# Patient Record
Sex: MALE | Race: WHITE | Employment: FULL TIME | URBAN - METROPOLITAN AREA
[De-identification: names, ages, dates, MRNs, and addresses within clinical notes are randomized per-mention and may not be internally consistent; named-entity substitution may affect disease eponyms.]

---

## 2017-01-30 ENCOUNTER — TELEPHONE (OUTPATIENT)
Dept: CARDIOLOGY CLINIC | Age: 52
End: 2017-01-30

## 2017-04-10 RX ORDER — ATORVASTATIN CALCIUM 40 MG/1
TABLET, FILM COATED ORAL
Qty: 30 TABLET | Refills: 6 | Status: SHIPPED | OUTPATIENT
Start: 2017-04-10 | End: 2019-03-13

## 2019-03-05 ENCOUNTER — TELEPHONE (OUTPATIENT)
Dept: CARDIOLOGY CLINIC | Age: 54
End: 2019-03-05

## 2019-03-13 ENCOUNTER — OFFICE VISIT (OUTPATIENT)
Dept: CARDIOLOGY CLINIC | Age: 54
End: 2019-03-13
Payer: COMMERCIAL

## 2019-03-13 VITALS
HEART RATE: 87 BPM | BODY MASS INDEX: 35.78 KG/M2 | OXYGEN SATURATION: 93 % | WEIGHT: 270 LBS | HEIGHT: 73 IN | DIASTOLIC BLOOD PRESSURE: 72 MMHG | SYSTOLIC BLOOD PRESSURE: 138 MMHG

## 2019-03-13 DIAGNOSIS — I25.10 ATHEROSCLEROSIS OF NATIVE CORONARY ARTERY OF NATIVE HEART WITHOUT ANGINA PECTORIS: Primary | ICD-10-CM

## 2019-03-13 DIAGNOSIS — E78.2 MIXED HYPERLIPIDEMIA: ICD-10-CM

## 2019-03-13 DIAGNOSIS — I10 ESSENTIAL HYPERTENSION: ICD-10-CM

## 2019-03-13 PROCEDURE — 99214 OFFICE O/P EST MOD 30 MIN: CPT | Performed by: NURSE PRACTITIONER

## 2019-03-13 PROCEDURE — 93000 ELECTROCARDIOGRAM COMPLETE: CPT | Performed by: NURSE PRACTITIONER

## 2019-03-13 RX ORDER — ATORVASTATIN CALCIUM 40 MG/1
40 TABLET, FILM COATED ORAL DAILY
COMMUNITY

## 2019-07-10 ENCOUNTER — HOSPITAL ENCOUNTER (OUTPATIENT)
Age: 54
Setting detail: OBSERVATION
Discharge: HOME | End: 2019-07-11
Attending: EMERGENCY MEDICINE | Admitting: INTERNAL MEDICINE
Payer: COMMERCIAL

## 2019-07-10 ENCOUNTER — APPOINTMENT (OUTPATIENT)
Dept: GENERAL RADIOLOGY | Age: 54
End: 2019-07-10
Payer: COMMERCIAL

## 2019-07-10 DIAGNOSIS — R11.0 NAUSEA: ICD-10-CM

## 2019-07-10 DIAGNOSIS — R42 LIGHTHEADEDNESS: ICD-10-CM

## 2019-07-10 DIAGNOSIS — M25.512 ACUTE PAIN OF BOTH SHOULDERS: Primary | ICD-10-CM

## 2019-07-10 DIAGNOSIS — R61 DIAPHORESIS: ICD-10-CM

## 2019-07-10 DIAGNOSIS — M25.511 ACUTE PAIN OF BOTH SHOULDERS: Primary | ICD-10-CM

## 2019-07-10 PROBLEM — R07.9 CHEST PAIN: Status: ACTIVE | Noted: 2019-07-10

## 2019-07-10 PROBLEM — I25.2 HISTORY OF HEART ATTACK: Status: ACTIVE | Noted: 2019-07-10

## 2019-07-10 LAB
A/G RATIO: 1.5 (ref 1.1–2.2)
ALBUMIN SERPL-MCNC: 4.3 G/DL (ref 3.4–5)
ALP BLD-CCNC: 68 U/L (ref 40–129)
ALT SERPL-CCNC: 41 U/L (ref 10–40)
ANION GAP SERPL CALCULATED.3IONS-SCNC: 14 MMOL/L (ref 3–16)
APTT: 30.2 SEC (ref 26–36)
AST SERPL-CCNC: 27 U/L (ref 15–37)
BASOPHILS ABSOLUTE: 0 K/UL (ref 0–0.2)
BASOPHILS RELATIVE PERCENT: 0.2 %
BILIRUB SERPL-MCNC: 0.4 MG/DL (ref 0–1)
BUN BLDV-MCNC: 14 MG/DL (ref 7–20)
CALCIUM SERPL-MCNC: 9 MG/DL (ref 8.3–10.6)
CHLORIDE BLD-SCNC: 102 MMOL/L (ref 99–110)
CO2: 21 MMOL/L (ref 21–32)
CREAT SERPL-MCNC: 0.7 MG/DL (ref 0.9–1.3)
EOSINOPHILS ABSOLUTE: 0.4 K/UL (ref 0–0.6)
EOSINOPHILS RELATIVE PERCENT: 3.4 %
GFR AFRICAN AMERICAN: >60
GFR NON-AFRICAN AMERICAN: >60
GLOBULIN: 2.8 G/DL
GLUCOSE BLD-MCNC: 223 MG/DL (ref 70–99)
HCT VFR BLD CALC: 45.7 % (ref 40.5–52.5)
HEMOGLOBIN: 15.4 G/DL (ref 13.5–17.5)
INR BLD: 0.99 (ref 0.86–1.14)
LYMPHOCYTES ABSOLUTE: 3.7 K/UL (ref 1–5.1)
LYMPHOCYTES RELATIVE PERCENT: 33.2 %
MCH RBC QN AUTO: 29.5 PG (ref 26–34)
MCHC RBC AUTO-ENTMCNC: 33.8 G/DL (ref 31–36)
MCV RBC AUTO: 87.3 FL (ref 80–100)
MONOCYTES ABSOLUTE: 0.8 K/UL (ref 0–1.3)
MONOCYTES RELATIVE PERCENT: 7.5 %
NEUTROPHILS ABSOLUTE: 6.2 K/UL (ref 1.7–7.7)
NEUTROPHILS RELATIVE PERCENT: 55.7 %
PDW BLD-RTO: 13.2 % (ref 12.4–15.4)
PLATELET # BLD: 201 K/UL (ref 135–450)
PMV BLD AUTO: 8.8 FL (ref 5–10.5)
POTASSIUM SERPL-SCNC: 3.8 MMOL/L (ref 3.5–5.1)
PRO-BNP: 17 PG/ML (ref 0–124)
PROTHROMBIN TIME: 11.3 SEC (ref 9.8–13)
RBC # BLD: 5.23 M/UL (ref 4.2–5.9)
SODIUM BLD-SCNC: 137 MMOL/L (ref 136–145)
TOTAL PROTEIN: 7.1 G/DL (ref 6.4–8.2)
TROPONIN: <0.01 NG/ML
WBC # BLD: 11.1 K/UL (ref 4–11)

## 2019-07-10 PROCEDURE — 83880 ASSAY OF NATRIURETIC PEPTIDE: CPT

## 2019-07-10 PROCEDURE — 85730 THROMBOPLASTIN TIME PARTIAL: CPT

## 2019-07-10 PROCEDURE — 6360000002 HC RX W HCPCS: Performed by: PHYSICIAN ASSISTANT

## 2019-07-10 PROCEDURE — 99285 EMERGENCY DEPT VISIT HI MDM: CPT

## 2019-07-10 PROCEDURE — 96374 THER/PROPH/DIAG INJ IV PUSH: CPT

## 2019-07-10 PROCEDURE — G0378 HOSPITAL OBSERVATION PER HR: HCPCS

## 2019-07-10 PROCEDURE — 93005 ELECTROCARDIOGRAM TRACING: CPT | Performed by: PHYSICIAN ASSISTANT

## 2019-07-10 PROCEDURE — 6370000000 HC RX 637 (ALT 250 FOR IP): Performed by: PHYSICIAN ASSISTANT

## 2019-07-10 PROCEDURE — 36415 COLL VENOUS BLD VENIPUNCTURE: CPT

## 2019-07-10 PROCEDURE — 85025 COMPLETE CBC W/AUTO DIFF WBC: CPT

## 2019-07-10 PROCEDURE — 84484 ASSAY OF TROPONIN QUANT: CPT

## 2019-07-10 PROCEDURE — 85610 PROTHROMBIN TIME: CPT

## 2019-07-10 PROCEDURE — 80053 COMPREHEN METABOLIC PANEL: CPT

## 2019-07-10 PROCEDURE — 71045 X-RAY EXAM CHEST 1 VIEW: CPT

## 2019-07-10 RX ORDER — ALPRAZOLAM 0.5 MG/1
0.25 TABLET ORAL 2 TIMES DAILY
Status: DISCONTINUED | OUTPATIENT
Start: 2019-07-11 | End: 2019-07-11 | Stop reason: HOSPADM

## 2019-07-10 RX ORDER — NICOTINE 21 MG/24HR
1 PATCH, TRANSDERMAL 24 HOURS TRANSDERMAL EVERY 24 HOURS
Status: DISCONTINUED | OUTPATIENT
Start: 2019-07-10 | End: 2019-07-11 | Stop reason: HOSPADM

## 2019-07-10 RX ORDER — ASPIRIN 81 MG/1
81 TABLET ORAL DAILY
Status: DISCONTINUED | OUTPATIENT
Start: 2019-07-11 | End: 2019-07-11 | Stop reason: HOSPADM

## 2019-07-10 RX ORDER — ONDANSETRON 2 MG/ML
4 INJECTION INTRAMUSCULAR; INTRAVENOUS EVERY 6 HOURS PRN
Status: DISCONTINUED | OUTPATIENT
Start: 2019-07-10 | End: 2019-07-11 | Stop reason: HOSPADM

## 2019-07-10 RX ORDER — ACETAMINOPHEN 325 MG/1
650 TABLET ORAL EVERY 4 HOURS PRN
Status: DISCONTINUED | OUTPATIENT
Start: 2019-07-10 | End: 2019-07-11 | Stop reason: HOSPADM

## 2019-07-10 RX ORDER — OMEPRAZOLE 20 MG/1
20 TABLET, DELAYED RELEASE ORAL DAILY
Status: DISCONTINUED | OUTPATIENT
Start: 2019-07-11 | End: 2019-07-10 | Stop reason: CLARIF

## 2019-07-10 RX ORDER — PANTOPRAZOLE SODIUM 40 MG/1
40 TABLET, DELAYED RELEASE ORAL
Status: DISCONTINUED | OUTPATIENT
Start: 2019-07-11 | End: 2019-07-11 | Stop reason: HOSPADM

## 2019-07-10 RX ORDER — POTASSIUM CHLORIDE 7.45 MG/ML
10 INJECTION INTRAVENOUS PRN
Status: DISCONTINUED | OUTPATIENT
Start: 2019-07-10 | End: 2019-07-11 | Stop reason: HOSPADM

## 2019-07-10 RX ORDER — ASPIRIN 81 MG/1
81 TABLET, CHEWABLE ORAL DAILY
Status: DISCONTINUED | OUTPATIENT
Start: 2019-07-11 | End: 2019-07-10

## 2019-07-10 RX ORDER — ACETAMINOPHEN 500 MG
500 TABLET ORAL ONCE
Status: COMPLETED | OUTPATIENT
Start: 2019-07-10 | End: 2019-07-10

## 2019-07-10 RX ORDER — SODIUM CHLORIDE 0.9 % (FLUSH) 0.9 %
10 SYRINGE (ML) INJECTION PRN
Status: DISCONTINUED | OUTPATIENT
Start: 2019-07-10 | End: 2019-07-11 | Stop reason: HOSPADM

## 2019-07-10 RX ORDER — ONDANSETRON 2 MG/ML
4 INJECTION INTRAMUSCULAR; INTRAVENOUS ONCE
Status: COMPLETED | OUTPATIENT
Start: 2019-07-10 | End: 2019-07-10

## 2019-07-10 RX ORDER — LISINOPRIL 10 MG/1
10 TABLET ORAL DAILY
Status: DISCONTINUED | OUTPATIENT
Start: 2019-07-11 | End: 2019-07-11 | Stop reason: HOSPADM

## 2019-07-10 RX ORDER — POTASSIUM CHLORIDE 20 MEQ/1
40 TABLET, EXTENDED RELEASE ORAL PRN
Status: DISCONTINUED | OUTPATIENT
Start: 2019-07-10 | End: 2019-07-11 | Stop reason: HOSPADM

## 2019-07-10 RX ORDER — SODIUM CHLORIDE 0.9 % (FLUSH) 0.9 %
10 SYRINGE (ML) INJECTION EVERY 12 HOURS SCHEDULED
Status: DISCONTINUED | OUTPATIENT
Start: 2019-07-10 | End: 2019-07-11 | Stop reason: HOSPADM

## 2019-07-10 RX ORDER — METHOCARBAMOL 500 MG/1
750 TABLET, FILM COATED ORAL EVERY 6 HOURS PRN
Status: DISCONTINUED | OUTPATIENT
Start: 2019-07-10 | End: 2019-07-11 | Stop reason: HOSPADM

## 2019-07-10 RX ORDER — ATORVASTATIN CALCIUM 80 MG/1
80 TABLET, FILM COATED ORAL NIGHTLY
Status: DISCONTINUED | OUTPATIENT
Start: 2019-07-11 | End: 2019-07-11 | Stop reason: HOSPADM

## 2019-07-10 RX ADMIN — ACETAMINOPHEN 500 MG: 500 TABLET, FILM COATED ORAL at 20:26

## 2019-07-10 RX ADMIN — ONDANSETRON 4 MG: 2 INJECTION INTRAMUSCULAR; INTRAVENOUS at 20:26

## 2019-07-10 RX ADMIN — NITROGLYCERIN 1 INCH: 20 OINTMENT TOPICAL at 20:26

## 2019-07-10 ASSESSMENT — ENCOUNTER SYMPTOMS
ABDOMINAL DISTENTION: 0
ABDOMINAL PAIN: 0
STRIDOR: 0
COLOR CHANGE: 0
SHORTNESS OF BREATH: 0
NAUSEA: 1
CONSTIPATION: 0
VOMITING: 0
BACK PAIN: 0
COUGH: 0
DIARRHEA: 0
WHEEZING: 0

## 2019-07-10 ASSESSMENT — PAIN DESCRIPTION - ORIENTATION: ORIENTATION: RIGHT;UPPER

## 2019-07-10 ASSESSMENT — PAIN SCALES - GENERAL
PAINLEVEL_OUTOF10: 0
PAINLEVEL_OUTOF10: 1
PAINLEVEL_OUTOF10: 1

## 2019-07-10 ASSESSMENT — PAIN DESCRIPTION - PAIN TYPE: TYPE: ACUTE PAIN

## 2019-07-10 ASSESSMENT — PAIN DESCRIPTION - LOCATION: LOCATION: CHEST

## 2019-07-10 ASSESSMENT — HEART SCORE
ECG: 1
ECG: 1

## 2019-07-10 ASSESSMENT — PAIN DESCRIPTION - PROGRESSION: CLINICAL_PROGRESSION: NOT CHANGED

## 2019-07-11 VITALS
HEART RATE: 82 BPM | BODY MASS INDEX: 34.19 KG/M2 | TEMPERATURE: 97 F | OXYGEN SATURATION: 94 % | WEIGHT: 258 LBS | DIASTOLIC BLOOD PRESSURE: 74 MMHG | HEIGHT: 73 IN | SYSTOLIC BLOOD PRESSURE: 119 MMHG | RESPIRATION RATE: 16 BRPM

## 2019-07-11 LAB
CHOLESTEROL, TOTAL: 119 MG/DL (ref 0–199)
EKG ATRIAL RATE: 93 BPM
EKG DIAGNOSIS: NORMAL
EKG P AXIS: 47 DEGREES
EKG P-R INTERVAL: 190 MS
EKG Q-T INTERVAL: 370 MS
EKG QRS DURATION: 94 MS
EKG QTC CALCULATION (BAZETT): 460 MS
EKG R AXIS: 26 DEGREES
EKG T AXIS: 37 DEGREES
EKG VENTRICULAR RATE: 93 BPM
HDLC SERPL-MCNC: 22 MG/DL (ref 40–60)
LDL CHOLESTEROL CALCULATED: 57 MG/DL
LV EF: 50 %
LVEF MODALITY: NORMAL
TRIGL SERPL-MCNC: 199 MG/DL (ref 0–150)
TROPONIN: <0.01 NG/ML
TROPONIN: <0.01 NG/ML
VLDLC SERPL CALC-MCNC: 40 MG/DL

## 2019-07-11 PROCEDURE — 94760 N-INVAS EAR/PLS OXIMETRY 1: CPT

## 2019-07-11 PROCEDURE — 80061 LIPID PANEL: CPT

## 2019-07-11 PROCEDURE — 6370000000 HC RX 637 (ALT 250 FOR IP): Performed by: NURSE PRACTITIONER

## 2019-07-11 PROCEDURE — 6370000000 HC RX 637 (ALT 250 FOR IP): Performed by: INTERNAL MEDICINE

## 2019-07-11 PROCEDURE — 84484 ASSAY OF TROPONIN QUANT: CPT

## 2019-07-11 PROCEDURE — 93010 ELECTROCARDIOGRAM REPORT: CPT | Performed by: INTERNAL MEDICINE

## 2019-07-11 PROCEDURE — 99244 OFF/OP CNSLTJ NEW/EST MOD 40: CPT | Performed by: INTERNAL MEDICINE

## 2019-07-11 PROCEDURE — 36415 COLL VENOUS BLD VENIPUNCTURE: CPT

## 2019-07-11 PROCEDURE — 93320 DOPPLER ECHO COMPLETE: CPT

## 2019-07-11 PROCEDURE — G0378 HOSPITAL OBSERVATION PER HR: HCPCS

## 2019-07-11 PROCEDURE — 93351 STRESS TTE COMPLETE: CPT

## 2019-07-11 RX ORDER — NITROGLYCERIN 0.4 MG/1
0.4 TABLET SUBLINGUAL EVERY 5 MIN PRN
Qty: 25 TABLET | Refills: 3 | Status: SHIPPED | OUTPATIENT
Start: 2019-07-11 | End: 2020-07-07

## 2019-07-11 RX ADMIN — ATORVASTATIN CALCIUM 80 MG: 80 TABLET, FILM COATED ORAL at 00:06

## 2019-07-11 RX ADMIN — METOPROLOL TARTRATE 25 MG: 25 TABLET, FILM COATED ORAL at 00:06

## 2019-07-11 RX ADMIN — ALPRAZOLAM 0.25 MG: 0.5 TABLET ORAL at 00:06

## 2019-07-11 RX ADMIN — PANTOPRAZOLE SODIUM 40 MG: 40 TABLET, DELAYED RELEASE ORAL at 06:08

## 2019-07-11 ASSESSMENT — PAIN SCALES - GENERAL
PAINLEVEL_OUTOF10: 0
PAINLEVEL_OUTOF10: 0

## 2019-07-11 NOTE — CONSULTS
file   Other Topics Concern    Not on file   Social History Narrative    Not on file        Family History:  No evidence for sudden cardiac death or premature CAD. Problem Relation Age of Onset    High Blood Pressure Mother     Arthritis Mother     Heart Disease Brother     Cancer Maternal Grandmother     Cancer Maternal Grandfather        Medications:    sodium chloride flush 0.9 % injection 10 mL 2 times per day   sodium chloride flush 0.9 % injection 10 mL PRN   magnesium hydroxide (MILK OF MAGNESIA) 400 MG/5ML suspension 30 mL Daily PRN   ondansetron (ZOFRAN) injection 4 mg Q6H PRN   enoxaparin (LOVENOX) injection 40 mg Daily   potassium chloride (KLOR-CON M) extended release tablet 40 mEq PRN   Or    potassium bicarb-citric acid (EFFER-K) effervescent tablet 40 mEq PRN   Or    potassium chloride 10 mEq/100 mL IVPB (Peripheral Line) PRN   acetaminophen (TYLENOL) tablet 650 mg Q4H PRN   methocarbamol (ROBAXIN) tablet 750 mg Q6H PRN   perflutren lipid microspheres (DEFINITY) injection 1.65 mg ONCE PRN   nicotine (NICODERM CQ) 14 MG/24HR 1 patch Q24H   metoprolol tartrate (LOPRESSOR) tablet 25 mg BID   ALPRAZolam (XANAX) tablet 0.25 mg BID   atorvastatin (LIPITOR) tablet 80 mg Nightly   aspirin EC tablet 81 mg Daily   lisinopril (PRINIVIL;ZESTRIL) tablet 10 mg Daily   pantoprazole (PROTONIX) tablet 40 mg QAM AC       Allergies:  Morphine;  Chantix [varenicline tartrate]; and Pcn [penicillins]     Review of Systems:   [x]Full ROS obtained and negative except as mentioned in HPI    Physical Examination:    BP (!) 95/56   Pulse 73   Temp 97.2 °F (36.2 °C) (Temporal)   Resp 16   Ht 6' 1\" (1.854 m)   Wt 258 lb (117 kg)   SpO2 91%   BMI 34.04 kg/m²   Wt Readings from Last 3 Encounters:   07/11/19 258 lb (117 kg)   03/13/19 270 lb (122.5 kg)   08/30/17 271 lb 2.7 oz (123 kg)       GENERAL: Well developed, well nourished, no acute distress  NEUROLOGICAL: Alert and oriented x3  PSYCH: Normal mood and

## 2019-07-11 NOTE — PLAN OF CARE
Problem: Cardiovascular  Goal: Hemodynamic stability  Outcome: Ongoing  Note:   Continue to monitor labs vitals heart rate and rhythm  testing and cardiology consult scheduled for 7/11/19     Problem: Pain Control  Goal: Maintain pain level at or below patient's acceptable level (or 5 if patient is unable to determine acceptable level)  Outcome: Ongoing  Note:   Pt is capable of communicating the presence of pain.  Medication as directed and per pt request. Pt advised to call for any changes in pain level and verbalizes understanding

## 2019-07-11 NOTE — ED PROVIDER NOTES
Negative for visual disturbance. Respiratory: Negative for cough, shortness of breath, wheezing and stridor. Cardiovascular: Negative for chest pain, palpitations and leg swelling. Gastrointestinal: Positive for nausea. Negative for abdominal distention, abdominal pain, constipation, diarrhea and vomiting. Genitourinary: Negative. Musculoskeletal: Positive for myalgias. Negative for back pain, neck pain and neck stiffness. Skin: Negative for color change, pallor, rash and wound. Neurological: Positive for light-headedness. Negative for dizziness, tremors, seizures, syncope, facial asymmetry, speech difficulty, weakness, numbness and headaches. Psychiatric/Behavioral: Negative for confusion. All other systems reviewed and are negative. Positives and Pertinent negatives as per HPI. Except as noted abovein the ROS, all other systems were reviewed and negative. PAST MEDICAL HISTORY     Past Medical History:   Diagnosis Date    CAD (coronary artery disease)     MI 7 years ago   Niya Gunning Heart attack [facility]) May 2011    Hyperlipidemia     Hypertension          SURGICAL HISTORY     Past Surgical History:   Procedure Laterality Date    ANGIOPLASTY      CHOLECYSTECTOMY, LAPAROSCOPIC      2005    CYST REMOVAL      on neck    SHOULDER SURGERY  2014         CURRENTMEDICATIONS       Previous Medications    ALPRAZOLAM (XANAX) 0.25 MG TABLET    Take 0.25 mg by mouth 2 times daily. ASPIRIN EC 81 MG EC TABLET    Take 1 tablet by mouth daily. ATORVASTATIN (LIPITOR) 80 MG TABLET    Take 80 mg by mouth daily    LISINOPRIL (PRINIVIL;ZESTRIL) 10 MG TABLET    Take 10 mg by mouth daily. METOPROLOL (LOPRESSOR) 25 MG TABLET    Take 1 tablet by mouth 2 times daily. METOPROLOL TARTRATE (LOPRESSOR) 25 MG TABLET    Take 25 mg by mouth 2 times daily    NITROGLYCERIN (NITROSTAT) 0.4 MG SL TABLET    Place 1 tablet under the tongue every 5 minutes as needed for Chest pain.     OMEPRAZOLE (PRILOSEC OTC) 20 MG disturbance/LBTB/PM  Patient Age: > 39 and < 72 years  *Risk factors for Atherosclerotic disease: Hypertension, Hypercholesterolemia, Coronary Artery Disease  Risk Factors: > 3 Risk factors or history of atherosclerotic disease*  Troponin: < 1X normal limit  Heart Score Total: 5      PHYSICAL EXAM    (up to 7 for level 4, 8 or more for level 5)     ED Triage Vitals [07/10/19 1954]   BP Temp Temp Source Pulse Resp SpO2 Height Weight   131/73 98.8 °F (37.1 °C) Oral 94 16 95 % 6' 1\" (1.854 m) 265 lb (120.2 kg)       Physical Exam   Constitutional: He is oriented to person, place, and time. He appears well-developed and well-nourished. No distress. HENT:   Head: Normocephalic and atraumatic. Right Ear: External ear normal.   Left Ear: External ear normal.   Nose: Nose normal.   Mouth/Throat: Oropharynx is clear and moist.   Eyes: Pupils are equal, round, and reactive to light. Conjunctivae and EOM are normal. Right eye exhibits no discharge. Left eye exhibits no discharge. No scleral icterus. Neck: Normal range of motion. No JVD present. Cardiovascular: Normal rate. Pulmonary/Chest: Effort normal and breath sounds normal.   Abdominal: Soft. Bowel sounds are normal. He exhibits no distension. There is no tenderness. Musculoskeletal: Normal range of motion. No extremity edema, posterior calf or thigh tenderness, palpable cord, discoloration. Negative homans. Lymphadenopathy:     He has no cervical adenopathy. Neurological: He is alert and oriented to person, place, and time. No cranial nerve deficit (II-XII Intact). Skin: Skin is warm. Capillary refill takes less than 2 seconds. No rash noted. He is diaphoretic (clammy). No erythema. No pallor. Psychiatric: He has a normal mood and affect. His behavior is normal.   Nursing note and vitals reviewed.       DIAGNOSTIC RESULTS   LABS:    Labs Reviewed   CBC WITH AUTO DIFFERENTIAL - Abnormal; Notable for the following components:       Result Value made to edit the dictations but occasionally words are mis-transcribed.)    Dimitri So PA-C (electronically signed)           Dimitri So PA-C  07/10/19 2044

## 2019-07-11 NOTE — DISCHARGE SUMMARY
0.25 MG tablet Take 0.25 mg by mouth 2 times daily. omeprazole (PRILOSEC OTC) 20 MG tablet Take 20 mg by mouth daily. aspirin EC 81 MG EC tablet Take 1 tablet by mouth daily. , Disp-30 tablet, R-3      !! nitroGLYCERIN (NITROSTAT) 0.4 MG SL tablet Place 1 tablet under the tongue every 5 minutes as needed for Chest pain., Disp-20 tablet, R-2      metoprolol (LOPRESSOR) 25 MG tablet Take 1 tablet by mouth 2 times daily. , Disp-60 tablet, R-11       !! - Potential duplicate medications found. Please discuss with provider. Discharge Medication List as of 7/11/2019  4:18 PM      STOP taking these medications       metoprolol tartrate (LOPRESSOR) 25 MG tablet Comments:   Reason for Stopping:               Discharge ROS:  A complete review of systems was asked and negative      Discharge Exam:    /74   Pulse 82   Temp 97 °F (36.1 °C) (Temporal)   Resp 16   Ht 6' 1\" (1.854 m)   Wt 258 lb (117 kg)   SpO2 94%   BMI 34.04 kg/m²   General appearance:  NAD  HEENT:   Normal cephalic, atraumatic, moist mucous membranes, no oropharyngeal erythema or exudate  Neck: Supple, trachea midline, no anterior cervical or SC LAD  Heart[de-identified] Normal S1/S2, no S3 or S4 RRR, no murmurs or rubs. Lungs:  Clear to auscultation bilaterally, No wheeze, rales or rhonchi noted. Abdomen: Soft, non-tender, non-distended,no organomegaly noted,  bowel sounds present, no masses  Musculoskeletal: Grossly intact,muscle strength equal and moves all four extremities 5/5 strength  Extremities: No clubbing, no cyanosis,no peripheral edema noted  Skin: No lesion or masses  Neurologic:  Neurovascularly intact without any focal sensory/motor deficits. Cranial nerves: II-XII intact, grossly non-focal.  Psychiatric:  A & O x3        Labs:  For convenience and continuity at follow-up the following most recent labs are provided:    Lab Results   Component Value Date    WBC 11.1 07/10/2019    HGB 15.4 07/10/2019    HCT 45.7 07/10/2019    MCV

## 2019-07-11 NOTE — PROGRESS NOTES
[facility]    7/11/2019 8:02 AM        Name: Danay Lopez . Admitted: 7/10/2019  Primary Care Provider: Shamika Sen MD (Tel: [phone])                        Hospital course:     53 y.o. male with PMHx of HTN, HLD, CAD, anxiety and tobacco abuse who presented to [facility] today with c/o symptoms that felt the same as when he had a \"heart attack\" before. He was out to dinner around 6:30pm and felt light headed and nauseous and started getting sweaty and went out to his truck to sit in the air conditioning cardiology service consulted, patient scheduled for stress test this afternoon. Subjective: Family at the bedside, patient discussed about stress at workplace    No acute events overnight. Resting well. Pain control. Diet ok. Labs reviewed  Denies any chest pain sob.      Reviewed interval ancillary notes    Current Medications    sodium chloride flush 0.9 % injection 10 mL 2 times per day   sodium chloride flush 0.9 % injection 10 mL PRN   magnesium hydroxide (MILK OF MAGNESIA) 400 MG/5ML suspension 30 mL Daily PRN   ondansetron (ZOFRAN) injection 4 mg Q6H PRN   enoxaparin (LOVENOX) injection 40 mg Daily   potassium chloride (KLOR-CON M) extended release tablet 40 mEq PRN   Or    potassium bicarb-citric acid (EFFER-K) effervescent tablet 40 mEq PRN   Or    potassium chloride 10 mEq/100 mL IVPB (Peripheral Line) PRN   acetaminophen (TYLENOL) tablet 650 mg Q4H PRN   methocarbamol (ROBAXIN) tablet 750 mg Q6H PRN   perflutren lipid microspheres (DEFINITY) injection 1.65 mg ONCE PRN   nicotine (NICODERM CQ) 14 MG/24HR 1 patch Q24H   metoprolol tartrate (LOPRESSOR) tablet 25 mg BID   ALPRAZolam (XANAX) tablet 0.25 mg BID   atorvastatin (LIPITOR) tablet 80 mg Nightly   aspirin EC tablet 81 mg Daily   lisinopril (PRINIVIL;ZESTRIL) tablet 10 mg Daily

## 2019-07-11 NOTE — ED PROVIDER NOTES
I independently performed a history and physical on Elvia Silverio. All diagnostic, treatment, and disposition decisions were made by myself in conjunction with the mid-level provider. She has been having nontraumatic bilateral shoulder pain radiating to his chest, associated with diaphoresis. This happened after eating tonight. This was his anginal equivalent before angioplasty 8 years ago. Concern is for ACS, EKG shows no ST elevation or depression. Patient has a heart score of 6 prior to troponin resulting. Will admit for further diagnostic work-up and treatment of his angina. For further details of [facility] emergency department encounter, please see Robert's documentation.      The Ekg interpreted by me shows  normal sinus rhythm with a rate of 93  Axis is   Normal  QTc is  460  Incomplete RBBB     ST Segments: normal  No prior ekg's    Results for orders placed or performed during the hospital encounter of 07/10/19   CBC Auto Differential   Result Value Ref Range    WBC 11.1 (H) 4.0 - 11.0 K/uL    RBC 5.23 4.20 - 5.90 M/uL    Hemoglobin 15.4 13.5 - 17.5 g/dL    Hematocrit 45.7 40.5 - 52.5 %    MCV 87.3 80.0 - 100.0 fL    MCH 29.5 26.0 - 34.0 pg    MCHC 33.8 31.0 - 36.0 g/dL    RDW 13.2 12.4 - 15.4 %    Platelets 125 323 - 646 K/uL    MPV 8.8 5.0 - 10.5 fL    Neutrophils % 55.7 %    Lymphocytes % 33.2 %    Monocytes % 7.5 %    Eosinophils % 3.4 %    Basophils % 0.2 %    Neutrophils # 6.2 1.7 - 7.7 K/uL    Lymphocytes # 3.7 1.0 - 5.1 K/uL    Monocytes # 0.8 0.0 - 1.3 K/uL    Eosinophils # 0.4 0.0 - 0.6 K/uL    Basophils # 0.0 0.0 - 0.2 K/uL   APTT   Result Value Ref Range    aPTT 30.2 26.0 - 36.0 sec   Protime-INR   Result Value Ref Range    Protime 11.3 9.8 - 13.0 sec    INR 0.99 0.86 - 1.14   EKG 12 Lead   Result Value Ref Range    Ventricular Rate 93 BPM    Atrial Rate 93 BPM    P-R Interval 190 ms    QRS Duration 94 ms    Q-T Interval 370 ms    QTc Calculation (Bazett) 460 ms    P Axis 47 degrees

## 2020-07-07 ENCOUNTER — OFFICE VISIT (OUTPATIENT)
Dept: CARDIOLOGY CLINIC | Age: 55
End: 2020-07-07
Payer: COMMERCIAL

## 2020-07-07 VITALS
WEIGHT: 262 LBS | HEART RATE: 78 BPM | OXYGEN SATURATION: 95 % | BODY MASS INDEX: 34.72 KG/M2 | HEIGHT: 73 IN | SYSTOLIC BLOOD PRESSURE: 130 MMHG | DIASTOLIC BLOOD PRESSURE: 70 MMHG

## 2020-07-07 PROCEDURE — 99214 OFFICE O/P EST MOD 30 MIN: CPT | Performed by: NURSE PRACTITIONER

## 2020-07-07 NOTE — PATIENT INSTRUCTIONS
Ask your PCP if wellbutrin would be an option for you for smoking cessation and anxiety    appt in 9 months with Dr. Selene Gupta

## 2020-07-07 NOTE — PROGRESS NOTES
[address]     Outpatient Follow Up Note    Melissa Mathis is 63 y.o. male who presents today with a history of  CAD s/p PTCA Rt RV branch July '11, HTN and hyperlipidemia. CHIEF COMPLAINT / HPI:  Follow Up secondary to coronary artery disease; his stress echo from last July was negative for ischemia    Subjective:     He denies (d) significant chest pain. His angina was lyndsey shoulder and arm pain/throbbing. He denies recurrence. There is no SOB. The patient denies orthopnea/PND. The patient does not have swelling. The patients weight is stable. The patient is not experiencing palpitations or dizziness. When he was having panic attacks he was SOB. He had 4 days of feeling panicked not long ago : took xanax prn. He was offered Lexapro of which he's not ready to start. These symptoms are stable since the last OV . With regard to medication therapy the patient has been compliant with prescribed regimen. They have tolerated therapy to date. Past Medical History:   Diagnosis Date    CAD (coronary artery disease)     MI 7 years ago   Jeannie Hernandez Heart attack [facility]) May 2011    Hyperlipidemia     Hypertension      Social History:    Social History     Tobacco Use   Smoking Status Light Tobacco Smoker    Packs/day: 1.00    Years: 35.00    Pack years: 35.00    Types: Cigarettes   Smokeless Tobacco Never Used   Tobacco Comment    Started back up after death of brother. Sts he has recently started on the patch. Current Medications:  Current Outpatient Medications   Medication Sig Dispense Refill    nitroGLYCERIN (NITROSTAT) 0.4 MG SL tablet Place 1 tablet under the tongue every 5 minutes as needed for Chest pain 25 tablet 3    atorvastatin (LIPITOR) 80 MG tablet Take 80 mg by mouth daily      lisinopril (PRINIVIL;ZESTRIL) 10 MG tablet Take 10 mg by mouth daily.  ALPRAZolam (XANAX) 0.25 MG tablet Take 0.25 mg by mouth 2 times daily.       omeprazole (PRILOSEC OTC) 20 MG tablet Take 20 mg by rhythm with no murmurs, gallops, rubs, or abnormal heart sounds, normal PMI. The apical impulses not displaced  JVP less than 8 cm H2O  Heart tones are crisp and normal  Cervical veins are not engorged  The carotid upstroke is normal in amplitude and contour without delay or bruit  JVP is not elevated  ABDOMEN:  Normal bowel sounds, non-distended and non-tender to palpation  EXT: No edema, no calf tenderness. Pulses are present bilaterally. DATA:      LABS:  10/19/18:   Na+ 136 K+ 4.3 cho 97 CO2 24 BUN 12 creatinine 0.76 glu 193    trig 254 HDL 22 LDL 73     10/22/19:   Na+ 136 K+ 4.0 chl 103 CO2 25 BUN 12 creatinine 0.68 glu 182    trig 551 HDL 24 LDL 85    Lab Results   Component Value Date    CHOL 119 07/11/2019    CHOL 141 08/30/2013    CHOL 119 05/09/2012     Lab Results   Component Value Date    TRIG 199 (H) 07/11/2019    TRIG 148 08/30/2013    TRIG 140 05/09/2012     Lab Results   Component Value Date    HDL 22 (L) 07/11/2019    HDL 27 (L) 08/30/2013    HDL 21 (L) 05/09/2012     Lab Results   Component Value Date    LDLCALC 57 07/11/2019    LDLCALC 84 08/30/2013    LDLCALC 70 05/09/2012     Lab Results   Component Value Date    LABVLDL 40 07/11/2019    LABVLDL 30 08/30/2013    LABVLDL 28 05/09/2012     Lab Results   Component Value Date     07/10/2019    K 3.8 07/10/2019     07/10/2019    CO2 21 07/10/2019    BUN 14 07/10/2019    CREATININE 0.7 (L) 07/10/2019    GLUCOSE 223 (H) 07/10/2019    CALCIUM 9.0 07/10/2019    PROT 7.1 07/10/2019    LABALBU 4.3 07/10/2019    BILITOT 0.4 07/10/2019    ALKPHOS 68 07/10/2019    AST 27 07/10/2019    ALT 41 (H) 07/10/2019    LABGLOM >60 07/10/2019    GFRAA >60 07/10/2019    AGRATIO 1.5 07/10/2019    GLOB 2.8 07/10/2019       Radiology Review:  Pertinent images / reports were reviewed as a part of this visit and reveals the following:    Stress echo: July '19:  Summary   Normal stress echocardiogram study.        Rest      ECG   Normal sinus remain elevated ; HDL low, glucose elevated  ~? Metabolic syndrome  ~atorvastatin  ~no longer taking fenofibrate  ~routine labs expected in the near future for PCP : consider adding A1c          I had the opportunity to review the clinical symptoms and presentation of Nehemiah Menchaca. Plan:     1. F/U with PCP : may benefit from Wellbutrin to aid in smoking cessation and possibly help with anxiety   2. F/U in 9 months with Dr. Bárbara Rodriguez    Overall the patient is stable from CV standpoint    I have addresed the patient's cardiac risk factors and adjusted pharmacologic treatment as needed. In addition, I have reinforced the need for patient directed risk factor modification. Further evaluation will be based upon the patient's clinical course and testing results. All questions and concerns were addressed to the patient. Alternatives to my treatment were discussed. The patient is currently smoking: ~/< 1 ppd for the past 7 yrs from brother's death. The risks related to smoking were reviewed with the patient. Recommend maintaining a smoke-free lifestyle. Products available for smoking cessation were discussed . Patient is on a beta-blocker  Patient is on an ace-i  Patient is on a statin    Antiplatelet therapy has been recommended / prescribed for this patient. Education conducted on adverse reactions including bleeding was discussed. The patient verbalizes understanding not to stop medications without discussing with us. Discussed exercise: 30-60 minutes 7 days/week. Work related: maintenance for two apartment buildings  Discussed Low saturated fat diet. Thank you for allowing to us to participate in the care of Nehemiah Menchaca.     ALENA Delvalle-CNP    Documentation of today's visit sent to PCP

## 2020-11-05 ENCOUNTER — HOSPITAL ENCOUNTER (EMERGENCY)
Age: 55
Discharge: HOME | End: 2020-11-05
Attending: EMERGENCY MEDICINE
Payer: COMMERCIAL

## 2020-11-05 VITALS
BODY MASS INDEX: 33.13 KG/M2 | DIASTOLIC BLOOD PRESSURE: 95 MMHG | SYSTOLIC BLOOD PRESSURE: 152 MMHG | WEIGHT: 250 LBS | HEART RATE: 94 BPM | RESPIRATION RATE: 16 BRPM | OXYGEN SATURATION: 95 % | HEIGHT: 73 IN | TEMPERATURE: 98 F

## 2020-11-05 PROCEDURE — 6370000000 HC RX 637 (ALT 250 FOR IP): Performed by: EMERGENCY MEDICINE

## 2020-11-05 PROCEDURE — 96372 THER/PROPH/DIAG INJ SC/IM: CPT

## 2020-11-05 PROCEDURE — 99283 EMERGENCY DEPT VISIT LOW MDM: CPT

## 2020-11-05 PROCEDURE — 6360000002 HC RX W HCPCS: Performed by: EMERGENCY MEDICINE

## 2020-11-05 RX ORDER — KETOROLAC TROMETHAMINE 30 MG/ML
30 INJECTION, SOLUTION INTRAMUSCULAR; INTRAVENOUS ONCE
Status: COMPLETED | OUTPATIENT
Start: 2020-11-05 | End: 2020-11-05

## 2020-11-05 RX ORDER — TIZANIDINE 4 MG/1
4 TABLET ORAL EVERY 6 HOURS PRN
Qty: 20 TABLET | Refills: 0 | Status: SHIPPED | OUTPATIENT
Start: 2020-11-05 | End: 2020-12-30

## 2020-11-05 RX ORDER — LIDOCAINE 4 G/G
1 PATCH TOPICAL DAILY
Qty: 30 PATCH | Refills: 0 | Status: SHIPPED | OUTPATIENT
Start: 2020-11-05 | End: 2020-12-05

## 2020-11-05 RX ORDER — CYCLOBENZAPRINE HCL 10 MG
10 TABLET ORAL ONCE
Status: COMPLETED | OUTPATIENT
Start: 2020-11-05 | End: 2020-11-05

## 2020-11-05 RX ORDER — LIDOCAINE 4 G/G
1 PATCH TOPICAL ONCE
Status: DISCONTINUED | OUTPATIENT
Start: 2020-11-05 | End: 2020-11-05 | Stop reason: HOSPADM

## 2020-11-05 RX ORDER — GABAPENTIN 100 MG/1
100 CAPSULE ORAL 2 TIMES DAILY
Qty: 20 CAPSULE | Refills: 0 | Status: SHIPPED | OUTPATIENT
Start: 2020-11-05 | End: 2020-12-30

## 2020-11-05 RX ADMIN — CYCLOBENZAPRINE 10 MG: 10 TABLET, FILM COATED ORAL at 18:55

## 2020-11-05 RX ADMIN — KETOROLAC TROMETHAMINE 30 MG: 30 INJECTION, SOLUTION INTRAMUSCULAR at 18:55

## 2020-11-05 ASSESSMENT — PAIN SCALES - GENERAL
PAINLEVEL_OUTOF10: 6
PAINLEVEL_OUTOF10: 9
PAINLEVEL_OUTOF10: 9

## 2020-11-05 ASSESSMENT — PAIN DESCRIPTION - LOCATION
LOCATION: BACK
LOCATION: BACK

## 2020-11-05 ASSESSMENT — PAIN DESCRIPTION - DESCRIPTORS
DESCRIPTORS: SHOOTING
DESCRIPTORS: SHOOTING

## 2020-11-05 ASSESSMENT — PAIN DESCRIPTION - PAIN TYPE
TYPE: CHRONIC PAIN
TYPE: CHRONIC PAIN

## 2020-11-05 ASSESSMENT — PAIN DESCRIPTION - FREQUENCY
FREQUENCY: CONTINUOUS
FREQUENCY: CONTINUOUS

## 2020-11-05 ASSESSMENT — PAIN DESCRIPTION - ONSET
ONSET: ON-GOING
ONSET: ON-GOING

## 2020-11-05 ASSESSMENT — PAIN DESCRIPTION - ORIENTATION
ORIENTATION: RIGHT
ORIENTATION: RIGHT

## 2020-11-06 ASSESSMENT — ENCOUNTER SYMPTOMS
RHINORRHEA: 0
BACK PAIN: 1
COLOR CHANGE: 0
NAUSEA: 0
CONSTIPATION: 0
WHEEZING: 0
VOMITING: 0
SHORTNESS OF BREATH: 0
PHOTOPHOBIA: 0

## 2020-11-07 NOTE — ED PROVIDER NOTES
[address] [facility]  EMERGENCY DEPARTMENTENCOUNTER      Pt Name: Joaquin Gray  MRN: [id number]  [place] 1965  Date ofevaluation: 11/5/2020  Provider: Ayaka Crowder MD    CHIEF COMPLAINT       Chief Complaint   Patient presents with    Back Pain     pt states chronic back pain worse since tuesday, went to his doctor tuesday but pain isnt better. pt states pain goes down right leg. HISTORY OF PRESENT ILLNESS   (Location/Symptom, Timing/Onset,Context/Setting, Quality, Duration, Modifying Factors, Severity)  Note limiting factors. Joaquin Gray is a 63 y.o. male  who  has a past medical history of CAD (coronary artery disease), Heart attack ([address] Hyperlipidemia, and Hypertension. who presents to the emergency department for evaluation of right-sided paraspinal back pain. Patient reports a history of slipped disc for which she has seen a back specialist and chiropractor in the past.  He states that he recently overexerted himself and lifted something causing pain in the paraspinal region with radiation around the posterior aspect of the right leg. He denies weakness paresthesias or changes in bowel or urine function. Denies saddle anesthesia or fevers. He is tried taking over-the-counter medications without relief. He states that he is currently awaiting to discuss physical rehabilitation with his physician. He denies foot traumas or inability to ambulate. HPI    NursingNotes were reviewed. REVIEW OF SYSTEMS    (2-9 systems for level 4, 10 or more for level 5)     Review of Systems   Constitutional: Negative for activity change, chills and fever. HENT: Negative for congestion and rhinorrhea. Eyes: Negative for photophobia and visual disturbance. Respiratory: Negative for shortness of breath and wheezing. Cardiovascular: Negative for palpitations and leg swelling. Gastrointestinal: Negative for constipation, nausea and vomiting.    Endocrine: Negative for polydipsia  Arthritis Mother     Heart Disease Brother     Cancer Maternal Grandmother     Cancer Maternal Grandfather           SOCIAL HISTORY       Social History     Socioeconomic History    Marital status:      Spouse name: Not on file    Number of children: Not on file    Years of education: Not on file    Highest education level: Not on file   Occupational History    Not on file   Social Needs    Financial resource strain: Not on file    Food insecurity     Worry: Not on file     Inability: Not on file   [employer] needs     Medical: Not on file     Non-medical: Not on file   Tobacco Use    Smoking status: Light Tobacco Smoker     Packs/day: 1.00     Years: 35.00     Pack years: 35.00     Types: Cigarettes    Smokeless tobacco: Never Used    Tobacco comment: Started back up after death of brother. Sts he has recently started on the patch. Substance and Sexual Activity    Alcohol use:  Yes     Alcohol/week: 12.0 standard drinks     Types: 12 Cans of beer per week     Comment: weekly    Drug use: No    Sexual activity: Yes     Partners: Female   Lifestyle    Physical activity     Days per week: Not on file     Minutes per session: Not on file    Stress: Not on file   Relationships    Social connections     Talks on phone: Not on file     Gets together: Not on file     Attends Jew service: Not on file     Active member of club or organization: Not on file     Attends meetings of clubs or organizations: Not on file     Relationship status: Not on file    Intimate partner violence     Fear of current or ex partner: Not on file     Emotionally abused: Not on file     Physically abused: Not on file     Forced sexual activity: Not on file   Other Topics Concern    Not on file   Social History Narrative    Not on file       SCREENINGS             PHYSICAL EXAM    (up to 7 for level 4, 8 or more for level 5)     ED Triage Vitals [11/05/20 1837]   BP Temp Temp Source Pulse Resp SpO2 Height Weight   (!) 152/95 98 °F (36.7 °C) Oral 94 16 95 % 6' 1\" (1.854 m) 250 lb (113.4 kg)       Physical Exam  Vitals signs and nursing note reviewed. Constitutional:       General: He is not in acute distress. Appearance: He is well-developed. HENT:      Head: Normocephalic and atraumatic. Eyes:      Conjunctiva/sclera: Conjunctivae normal.   Neck:      Musculoskeletal: Normal range of motion. Trachea: No tracheal deviation. Cardiovascular:      Rate and Rhythm: Normal rate and regular rhythm. Pulmonary:      Effort: Pulmonary effort is normal.      Breath sounds: Normal breath sounds. No wheezing or rales. Abdominal:      General: There is no distension. Palpations: Abdomen is soft. Tenderness: There is no abdominal tenderness. Musculoskeletal: Normal range of motion. General: Tenderness present. No deformity. Back:    Skin:     General: Skin is warm and dry. Capillary Refill: Capillary refill takes less than 2 seconds. Neurological:      General: No focal deficit present. Mental Status: He is alert and oriented to person, place, and time. Sensory: No sensory deficit. Motor: No weakness. Coordination: Coordination normal.      Gait: Gait normal.         RESULTS     EKG: All EKG's are interpreted by the Emergency Department Physician who either signs or Co-signsthis chart in the absence of a cardiologist.      RADIOLOGY:   Keaton Kauffman such as CT, Ultrasound and MRI are read by the radiologist. Plain radiographic images are visualized and preliminarily interpreted by the emergency physician with the below findings:        Interpretation per the Radiologist below, if available at the time ofthis note:    No orders to display         ED BEDSIDE ULTRASOUND:   Performed by ED Physician - none    LABS:  Labs Reviewed - No data to display    All other labs were within normal range or not returned as of this dictation.     EMERGENCY DEPARTMENT COURSE and DIFFERENTIAL DIAGNOSIS/MDM:   Vitals:    Vitals:    11/05/20 1837   BP: (!) 152/95   Pulse: 94   Resp: 16   Temp: 98 °F (36.7 °C)   TempSrc: Oral   SpO2: 95%   Weight: 250 lb (113.4 kg)   Height: 6' 1\" (1.854 m)       Patient was given thefollowing medications:  Medications   ketorolac (TORADOL) injection 30 mg (30 mg Intramuscular Given 11/5/20 1855)   cyclobenzaprine (FLEXERIL) tablet 10 mg (10 mg Oral Given 11/5/20 1855)       ED COURSE & MEDICAL DECISION MAKING    Pertinent Labs & Imaging studies reviewed. (See chart for details)   -  Patient seen and evaluated in the emergency department. -  Triage and nursing notes reviewed and incorporated. -  Old chart records reviewed and incorporated. -  Differential diagnosis includes: Differential Diagnosis: Spinal Epidural Abscess, Vertebral Osteomyelitis, Cauda Equina Syndrome, Spinal Cord Compression, Conus Medullaris Syndrome, ruptured/dissecting Abdominal Aortic Aneurysm, Metastases to the back, Kidney Stone, Pyelonephritis, Fracture or dislocation, other    -  Work-up included:  See above  -  ED treatment included: See above  -  Results discussed with patient. Patient with a history of nonradiculopathy and slipped disc was in to the ED for evaluation of exacerbation of chronic back pain. He is ambulating and denies any signs or symptoms concerning for cord compression. He does have reproducible pain to palpation of the paraspinal region on the right side. He does have close follow-up with a back specialist and a chiropractor. Patient treated symptomatically and reevaluation states that he does feel improved. As this is a chronic issue and the patient has had no significant trauma and no signs or symptoms concerning for cord compression I do think you benefit from imaging at this time. Return precautions discussed with the patient. The patient is agreeable with plan of care and disposition.         REASSESSMENT          CRITICAL CARE

## 2020-11-08 NOTE — ED NOTES
Pt. Called c/o Zanaflex is not working, nauseated, requesting something else for pain, informed we cannot make any changes in medication. Instructed to call PMD or return to ED for re-evaluation.      Taye Moran RN  11/08/20 [id number]

## 2020-12-30 ENCOUNTER — HOSPITAL ENCOUNTER (OUTPATIENT)
Age: 55
Discharge: HOME | End: 2020-12-30
Payer: COMMERCIAL

## 2020-12-30 ENCOUNTER — OFFICE VISIT (OUTPATIENT)
Dept: CARDIOLOGY CLINIC | Age: 55
End: 2020-12-30
Payer: COMMERCIAL

## 2020-12-30 VITALS
DIASTOLIC BLOOD PRESSURE: 64 MMHG | BODY MASS INDEX: 33 KG/M2 | RESPIRATION RATE: 20 BRPM | WEIGHT: 249 LBS | HEIGHT: 73 IN | HEART RATE: 80 BPM | SYSTOLIC BLOOD PRESSURE: 132 MMHG | OXYGEN SATURATION: 98 %

## 2020-12-30 DIAGNOSIS — I10 ESSENTIAL HYPERTENSION: ICD-10-CM

## 2020-12-30 DIAGNOSIS — E78.2 MIXED HYPERLIPIDEMIA: ICD-10-CM

## 2020-12-30 DIAGNOSIS — I25.10 ATHEROSCLEROSIS OF NATIVE CORONARY ARTERY OF NATIVE HEART WITHOUT ANGINA PECTORIS: Primary | ICD-10-CM

## 2020-12-30 DIAGNOSIS — R25.2 LEG CRAMPS: ICD-10-CM

## 2020-12-30 DIAGNOSIS — R00.2 PALPITATIONS: ICD-10-CM

## 2020-12-30 LAB
ANION GAP SERPL CALCULATED.3IONS-SCNC: 11 MMOL/L (ref 3–16)
BUN BLDV-MCNC: 13 MG/DL (ref 7–20)
CALCIUM SERPL-MCNC: 9.2 MG/DL (ref 8.3–10.6)
CHLORIDE BLD-SCNC: 99 MMOL/L (ref 99–110)
CO2: 25 MMOL/L (ref 21–32)
CREAT SERPL-MCNC: 0.7 MG/DL (ref 0.9–1.3)
GFR AFRICAN AMERICAN: >60
GFR NON-AFRICAN AMERICAN: >60
GLUCOSE BLD-MCNC: 232 MG/DL (ref 70–99)
MAGNESIUM: 1.9 MG/DL (ref 1.8–2.4)
POTASSIUM SERPL-SCNC: 4.2 MMOL/L (ref 3.5–5.1)
SODIUM BLD-SCNC: 135 MMOL/L (ref 136–145)

## 2020-12-30 PROCEDURE — 36415 COLL VENOUS BLD VENIPUNCTURE: CPT

## 2020-12-30 PROCEDURE — 93000 ELECTROCARDIOGRAM COMPLETE: CPT | Performed by: NURSE PRACTITIONER

## 2020-12-30 PROCEDURE — 83735 ASSAY OF MAGNESIUM: CPT

## 2020-12-30 PROCEDURE — 80048 BASIC METABOLIC PNL TOTAL CA: CPT

## 2020-12-30 PROCEDURE — 99214 OFFICE O/P EST MOD 30 MIN: CPT | Performed by: NURSE PRACTITIONER

## 2020-12-30 NOTE — PROGRESS NOTES
[address]     Outpatient Follow Up Note      CHIEF COMPLAINT / HPI:  Follow Up secondary to coronary artery disease ; c/o palpitations    Subjective:   Fernando Abel is 63 y.o. male who presents today with a history of CAD s/p PTCA Rt RV branch July '11, HTN and hyperlipidemia. Today, he denies significant chest pain. There is no SOB/FRANKLIN. The patient denies orthopnea/PND. Denies swelling. He states his weight is stable . Pt reports a hx of significant back pain and couple months ago received steroid injections in his back. This is when he started noticing some heart palpitations (skipped beats). He has had a total of three injections in the matter of months. After each injection his palpitations symptoms worsened. Palpitations occur 5-6x daily (for past 4-5days). Can last for a couple min to off for duration up to 30min. Associated with fatigue. The patient is not experiencing dizziness. Does not drink soda, drinks 2 cups of coffee daily. Has decreased smoking to 1/2 ppd (from 2ppd). Also reports having \"charliehorse cramps\" in legs. With regard to medication therapy the patient has been compliant with prescribed regimen. They have tolerated therapy to date. Past Medical History:   Diagnosis Date    CAD (coronary artery disease)     MI 7 years ago   Katheryn Downs Heart attack [facility]) May 2011    Hyperlipidemia     Hypertension      Social History:    Social History     Tobacco Use   Smoking Status Light Tobacco Smoker    Packs/day: 1.00    Years: 35.00    Pack years: 35.00    Types: Cigarettes   Smokeless Tobacco Never Used   Tobacco Comment    Started back up after death of brother. Sts he has recently started on the patch.       Current Medications:  Current Outpatient Medications   Medication Sig Dispense Refill    tiZANidine (ZANAFLEX) 4 MG tablet Take 1 tablet by mouth every 6 hours as needed (msucle pain) 20 tablet 0  gabapentin (NEURONTIN) 100 MG capsule Take 1 capsule by mouth 2 times daily for 10 days. Intended supply: 30 days 20 capsule 0    atorvastatin (LIPITOR) 80 MG tablet Take 40 mg by mouth daily       lisinopril (PRINIVIL;ZESTRIL) 10 MG tablet Take 10 mg by mouth daily.  ALPRAZolam (XANAX) 0.25 MG tablet Take 0.25 mg by mouth 2 times daily.  omeprazole (PRILOSEC OTC) 20 MG tablet Take 20 mg by mouth daily.  aspirin EC 81 MG EC tablet Take 1 tablet by mouth daily. 30 tablet 3    nitroGLYCERIN (NITROSTAT) 0.4 MG SL tablet Place 1 tablet under the tongue every 5 minutes as needed for Chest pain. 20 tablet 2    metoprolol (LOPRESSOR) 25 MG tablet Take 1 tablet by mouth 2 times daily. 60 tablet 11     No current facility-administered medications for this visit. REVIEW OF SYSTEMS:    CONSTITUTIONAL: No major weight gain or loss, night sweats, fever, fatigue, or weakness. HEENT: No new vision difficulties or ringing in the ears. RESPIRATORY: No new SOB, PND, orthopnea or cough. CARDIOVASCULAR: See HPI  GI: No N/V/D, constipation, or abdominal pain. No black/tarry stools  : No urinary urgency, incontinence, or hematuria. SKIN: No cyanosis or skin lesions. MUSCULOSKELETAL: No new muscle or joint pain. NEUROLOGICAL: No syncope or TIA-like symptoms. PSYCHIATRIC: No anxiety, pain, insomnia or depression    Objective:   PHYSICAL EXAM:       VITALS:  /64 (Site: Left Upper Arm, Position: Sitting, Cuff Size: Medium Adult)   Pulse 80   Resp 20   Ht 6' 1\" (1.854 m)   Wt 249 lb (112.9 kg)   SpO2 98%   BMI 32.85 kg/m²   CONSTITUTIONAL: Cooperative, no apparent distress, and appears well nourished / developed  NEUROLOGIC:  Awake and orientated to person, place, and time. PSYCH: Calm affect. SKIN: Warm and dry. HEENT: Sclera non-icteric, normocephalic, neck supple. RESPIRATORY:  No increased work of breathing and clear to auscultation, no crackles or wheezing. CARDIOVASCULAR:  Regular rate and rhythm without murmur. Normal S1 and S2. No gallops or rubs. Normal PMI. No elevation of JVP. Normal carotid pulses with no bruits. GI:  Normal bowel sounds. Non-distended. Non-tender to palpation  EXT: No edema. No calf tenderness. Pulses are present bilaterally. DATA:    Lab Results   Component Value Date    ALT 41 (H) 07/10/2019    AST 27 07/10/2019    ALKPHOS 68 07/10/2019    BILITOT 0.4 07/10/2019     Lab Results   Component Value Date    CREATININE 0.7 (L) 07/10/2019    BUN 14 07/10/2019     07/10/2019    K 3.8 07/10/2019     07/10/2019    CO2 21 07/10/2019     No results found for: TSH, Y7SOLDT, E0IMDQF, THYROIDAB  Lab Results   Component Value Date    WBC 11.1 (H) 07/10/2019    HGB 15.4 07/10/2019    HCT 45.7 07/10/2019    MCV 87.3 07/10/2019     07/10/2019     No components found for: CHLPL  Lab Results   Component Value Date    TRIG 199 (H) 07/11/2019    TRIG 148 08/30/2013    TRIG 140 05/09/2012     Lab Results   Component Value Date    HDL 22 (L) 07/11/2019    HDL 27 (L) 08/30/2013    HDL 21 (L) 05/09/2012     Lab Results   Component Value Date    LDLCALC 57 07/11/2019    LDLCALC 84 08/30/2013    LDLCALC 70 05/09/2012     Lab Results   Component Value Date    LABVLDL 40 07/11/2019    LABVLDL 30 08/30/2013    LABVLDL 28 05/09/2012      No results found for: BNP  Radiology Review:  Pertinent images / reports were reviewed as a part of this visit and reveals the following:    Last Echo: 7/11/19  Summary   -Normal left ventricle size, wall thickness and systolic function with an   estimated ejection fraction of 55-60%. No regional wall motion abnormalities   are seen. E/e\"= 6.75 .   -Normal diastolic function.   -The aortic valve appears sclerotic but opens well.   -Trivial tricuspid regurgitation. Stress test: 5/2012  IMPRESSION- Leeann Labella is no evidence for reversible ischemia of the   left ventricle.      Last Stress Echo: 7/2019  Conclusions Summary   Normal stress echocardiogram study. Last Angiogram: 2011  100% Anterior RV branch occlussion--POBA 2.0 X 10 with less than 20% residual--Normal LV fxn,    Last EC20  Sinus  Rhythm   Low voltage in limb leads. Assessment:     1. Coronary artery disease   ~ stable; no c/o angina   ~ his angina is bilateral shoulder pain/throbbing   ~ LHC 2011: POBA of anterior RV branch; residual 30% proximal RCA, 10% dLM     ~ Stress echo 2019: normal   ~ ASA/ metoprolol/ lipitor     2. Hyperlipidemia   ~ Chol 135, trig 383, HDL 26, LDL 32, LFTs normal (2020)  ~ lipitor 40 (decreased form 80 d/t elevated ALT)    3. Hypertension   ~ controlled     4. Tobacco abuse   ~ recommend smoking cessation     5. Diabetes   ~ A1C 7.9 (2020)    6. Palpitations   ~ notice since receiving steroid back injections   ~ ECG today with SR    I had the opportunity to review the clinical symptoms and presentation of Concepción Sal. Plan:     1. Continue current medications   2. 24hr Holter monitor. BMP and Mag today. 3. Follow up with Dr. Candelaria Simpson in 5 months     Overall the patient is stable from CV standpoint    I have addresed the patient's cardiac risk factors and adjusted pharmacologic treatment as needed. In addition, I have reinforced the need for patient directed risk factor modification. Further evaluation will be based upon the patient's clinical course and testing results. All questions and concerns were addressed to the patient. Alternatives to my treatment were discussed. The patient verbalizes understanding not to stop medications without discussing with us. The patient is currently smoking. The risks related to smoking were reviewed with the patient. Recommend maintaining a smoke-free lifestyle. Products available for smoking cessation were discussed.     Patient is on a beta-blocker  Patient is on an ACE   Patient is on a statin

## 2020-12-30 NOTE — PATIENT INSTRUCTIONS
continue current medications     24hr Holter monitor     Follow up in 5 months with Dr. Sruthi Latham  Call with any concerns.

## 2020-12-31 ENCOUNTER — TELEPHONE (OUTPATIENT)
Dept: CARDIOLOGY CLINIC | Age: 55
End: 2020-12-31

## 2020-12-31 NOTE — TELEPHONE ENCOUNTER
----- Message from Charlyn Severe, APRN - CNP sent at 12/31/2020 11:25 AM EST -----  Electrolytes are ok in general. Sodium minimally low at 135, try to limit FREE WATER intake. Glucose is elevated at 232.

## 2021-01-05 ENCOUNTER — TELEPHONE (OUTPATIENT)
Dept: CARDIOLOGY CLINIC | Age: 56
End: 2021-01-05

## 2021-01-05 NOTE — TELEPHONE ENCOUNTER
----- Message from ALENA Dean CNP sent at 12/31/2020 11:25 AM EST -----  Electrolytes are ok in general. Sodium minimally low at 135, try to limit FREE WATER intake. Glucose is elevated at 232.

## 2021-01-05 NOTE — TELEPHONE ENCOUNTER
I spoke with pt and relayed message per NPKL. He verbalized understanding. He also questioned about his monitor. I checked and johnnie has not generated the report yet.

## 2021-01-07 PROCEDURE — 93224 XTRNL ECG REC UP TO 48 HRS: CPT | Performed by: INTERNAL MEDICINE

## 2023-04-05 ENCOUNTER — HOSPITAL ENCOUNTER (EMERGENCY)
Age: 58
Discharge: HOME | End: 2023-04-05

## 2023-04-05 ENCOUNTER — APPOINTMENT (OUTPATIENT)
Dept: GENERAL RADIOLOGY | Age: 58
End: 2023-04-05

## 2023-04-05 VITALS
SYSTOLIC BLOOD PRESSURE: 131 MMHG | OXYGEN SATURATION: 98 % | TEMPERATURE: 97.9 F | BODY MASS INDEX: 33.63 KG/M2 | HEIGHT: 73 IN | RESPIRATION RATE: 17 BRPM | HEART RATE: 84 BPM | DIASTOLIC BLOOD PRESSURE: 79 MMHG | WEIGHT: 253.75 LBS

## 2023-04-05 DIAGNOSIS — M54.42 ACUTE LEFT-SIDED LOW BACK PAIN WITH LEFT-SIDED SCIATICA: Primary | ICD-10-CM

## 2023-04-05 DIAGNOSIS — R73.9 HYPERGLYCEMIA: ICD-10-CM

## 2023-04-05 DIAGNOSIS — Z72.0 TOBACCO ABUSE: ICD-10-CM

## 2023-04-05 LAB
ANION GAP SERPL CALCULATED.3IONS-SCNC: 12 MMOL/L (ref 3–16)
BASOPHILS # BLD: 0.1 K/UL (ref 0–0.2)
BASOPHILS NFR BLD: 0.6 %
BUN SERPL-MCNC: 9 MG/DL (ref 7–20)
CALCIUM SERPL-MCNC: 9.4 MG/DL (ref 8.3–10.6)
CHLORIDE SERPL-SCNC: 99 MMOL/L (ref 99–110)
CO2 SERPL-SCNC: 24 MMOL/L (ref 21–32)
CREAT SERPL-MCNC: 0.6 MG/DL (ref 0.9–1.3)
DEPRECATED RDW RBC AUTO: 13.4 % (ref 12.4–15.4)
EOSINOPHIL # BLD: 0.1 K/UL (ref 0–0.6)
EOSINOPHIL NFR BLD: 1 %
GFR SERPLBLD CREATININE-BSD FMLA CKD-EPI: >60 ML/MIN/{1.73_M2}
GLUCOSE SERPL-MCNC: 188 MG/DL (ref 70–99)
HCT VFR BLD AUTO: 45.6 % (ref 40.5–52.5)
HGB BLD-MCNC: 15.6 G/DL (ref 13.5–17.5)
LYMPHOCYTES # BLD: 2.1 K/UL (ref 1–5.1)
LYMPHOCYTES NFR BLD: 16 %
MCH RBC QN AUTO: 29.4 PG (ref 26–34)
MCHC RBC AUTO-ENTMCNC: 34.2 G/DL (ref 31–36)
MCV RBC AUTO: 85.9 FL (ref 80–100)
MONOCYTES # BLD: 0.8 K/UL (ref 0–1.3)
MONOCYTES NFR BLD: 6.1 %
NEUTROPHILS # BLD: 10 K/UL (ref 1.7–7.7)
NEUTROPHILS NFR BLD: 76.3 %
PLATELET # BLD AUTO: 219 K/UL (ref 135–450)
PMV BLD AUTO: 8.8 FL (ref 5–10.5)
POTASSIUM SERPL-SCNC: 4.1 MMOL/L (ref 3.5–5.1)
RBC # BLD AUTO: 5.31 M/UL (ref 4.2–5.9)
SODIUM SERPL-SCNC: 135 MMOL/L (ref 136–145)
WBC # BLD AUTO: 13.2 K/UL (ref 4–11)

## 2023-04-05 PROCEDURE — 6360000002 HC RX W HCPCS: Performed by: GENERAL ACUTE CARE HOSPITAL

## 2023-04-05 PROCEDURE — 85025 COMPLETE CBC W/AUTO DIFF WBC: CPT

## 2023-04-05 PROCEDURE — 72100 X-RAY EXAM L-S SPINE 2/3 VWS: CPT

## 2023-04-05 PROCEDURE — 80048 BASIC METABOLIC PNL TOTAL CA: CPT

## 2023-04-05 RX ORDER — ORPHENADRINE CITRATE 30 MG/ML
30 INJECTION INTRAMUSCULAR; INTRAVENOUS ONCE
Status: COMPLETED | OUTPATIENT
Start: 2023-04-05 | End: 2023-04-05

## 2023-04-05 RX ORDER — IBUPROFEN 800 MG/1
800 TABLET ORAL EVERY 8 HOURS PRN
Qty: 20 TABLET | Refills: 0 | Status: SHIPPED | OUTPATIENT
Start: 2023-04-05 | End: 2023-04-15

## 2023-04-05 RX ORDER — CYCLOBENZAPRINE HCL 10 MG
10 TABLET ORAL 3 TIMES DAILY PRN
Qty: 20 TABLET | Refills: 0 | Status: SHIPPED | OUTPATIENT
Start: 2023-04-05 | End: 2023-04-12

## 2023-04-05 RX ORDER — KETOROLAC TROMETHAMINE 30 MG/ML
30 INJECTION, SOLUTION INTRAMUSCULAR; INTRAVENOUS ONCE
Status: COMPLETED | OUTPATIENT
Start: 2023-04-05 | End: 2023-04-05

## 2023-04-05 RX ORDER — DEXAMETHASONE SODIUM PHOSPHATE 4 MG/ML
10 INJECTION, SOLUTION INTRA-ARTICULAR; INTRALESIONAL; INTRAMUSCULAR; INTRAVENOUS; SOFT TISSUE ONCE
Status: COMPLETED | OUTPATIENT
Start: 2023-04-05 | End: 2023-04-05

## 2023-04-05 RX ADMIN — DEXAMETHASONE SODIUM PHOSPHATE 10 MG: 4 INJECTION, SOLUTION INTRAMUSCULAR; INTRAVENOUS at 11:33

## 2023-04-05 RX ADMIN — KETOROLAC TROMETHAMINE 30 MG: 30 INJECTION, SOLUTION INTRAMUSCULAR at 11:33

## 2023-04-05 RX ADMIN — ORPHENADRINE CITRATE 30 MG: 30 INJECTION INTRAMUSCULAR; INTRAVENOUS at 11:33

## 2023-04-05 ASSESSMENT — PAIN DESCRIPTION - ORIENTATION
ORIENTATION: LEFT
ORIENTATION: LEFT

## 2023-04-05 ASSESSMENT — PAIN DESCRIPTION - DESCRIPTORS: DESCRIPTORS: STABBING;SHOOTING

## 2023-04-05 ASSESSMENT — PAIN SCALES - GENERAL
PAINLEVEL_OUTOF10: 4
PAINLEVEL_OUTOF10: 5

## 2023-04-05 ASSESSMENT — PAIN DESCRIPTION - FREQUENCY: FREQUENCY: CONTINUOUS

## 2023-04-05 ASSESSMENT — PAIN DESCRIPTION - LOCATION
LOCATION: HIP
LOCATION: HIP;LEG

## 2023-04-05 ASSESSMENT — PAIN DESCRIPTION - ONSET: ONSET: SUDDEN

## 2023-04-05 ASSESSMENT — PAIN DESCRIPTION - PAIN TYPE: TYPE: ACUTE PAIN

## 2023-04-05 ASSESSMENT — PAIN - FUNCTIONAL ASSESSMENT: PAIN_FUNCTIONAL_ASSESSMENT: 0-10

## 2023-04-05 NOTE — DISCHARGE INSTRUCTIONS
Increase your water intake. It is important that you follow the advised diabetes diet. Apply ice to any sore area for 20 minutes every 3-4 hours. Take the prescribed ibuprofen and Flexeril as directed to help with your discomfort. Continue your home medications as directed. Return for high fever, incessant vomiting, severe pain, any other worsening symptoms.

## 2023-04-05 NOTE — ED TRIAGE NOTES
To ED via EMS due to inability to ambulate due to L hip/leg pain. Pt states had mild back pain yesterday that progressed throughout the night. Pain is in L hip, lower back and shoots down left leg. Unable to walk due to pain. Hx of sciatica in R side. Denies any new numbness or tingling in L extremity. CMS intact. Pt being treated w/ IV antibiotics at home due to post op infection of shoulder. PICC in place. Infectious disease doctor wanted him to be seen in ED.

## 2023-04-05 NOTE — ED PROVIDER NOTES
the time of this note:    XR LUMBAR SPINE (2-3 VIEWS)   Final Result   No acute osseous abnormality. Mild degenerative change. XR LUMBAR SPINE (2-3 VIEWS)    Result Date: 4/5/2023  EXAMINATION: 3 XRAY VIEWS OF THE LUMBAR SPINE 4/5/2023 11:29 am COMPARISON: None. HISTORY: ORDERING SYSTEM PROVIDED HISTORY: pain TECHNOLOGIST PROVIDED HISTORY: Reason for exam:->pain Reason for Exam: PAIN FINDINGS: Lumbar vertebral bodies are normal in height and alignment. No evidence of fracture. Visualized sacrum is unremarkable. Mild degenerative changes. No acute osseous abnormality. Mild degenerative change. No results found. PROCEDURES   Unless otherwise noted below, none     Procedures    CRITICAL CARE TIME (.cctime)   none    PAST MEDICAL HISTORY      has a past medical history of CAD (coronary artery disease), Heart attack [facility]) (May 2011), Hyperlipidemia, and Hypertension. EMERGENCY DEPARTMENT COURSE and DIFFERENTIAL DIAGNOSIS/MDM:   Vitals:    Vitals:    04/05/23 0959 04/05/23 1345   BP: 123/76 131/79   Pulse: 88 84   Resp: 16 17   Temp: 98 °F (36.7 °C) 97.9 °F (36.6 °C)   TempSrc: Oral Oral   SpO2: 95% 98%   Weight: 253 lb 12 oz (115.1 kg)    Height: 6' 1\" (1.854 m)        Patient was given the following medications:  Medications   ketorolac (TORADOL) injection 30 mg (30 mg IntraVENous Given 4/5/23 1133)   orphenadrine (NORFLEX) injection 30 mg (30 mg IntraVENous Given 4/5/23 1133)   Dexamethasone Sodium Phosphate injection 10 mg (10 mg IntraVENous Given 4/5/23 1133)             Is this patient to be included in the SEP-1 Core Measure due to severe sepsis or septic shock? No   Exclusion criteria - the patient is NOT to be included for SEP-1 Core Measure due to:  2+ SIRS criteria are not met    Chronic Conditions affecting care:    has a past medical history of CAD (coronary artery disease), Heart attack [facility]) (May 2011), Hyperlipidemia, and Hypertension.     CONSULTS: (Who and What was

## 2023-04-06 ENCOUNTER — TELEPHONE (OUTPATIENT)
Dept: CARDIOLOGY CLINIC | Age: 58
End: 2023-04-06

## 2023-04-06 NOTE — TELEPHONE ENCOUNTER
Patient was referred to the [address] [place] [address] location with Dr. Bob Nowak. Patient has been scheduled for 08/21 at 8:00am (am/pm). Patient verbalized understanding of appointment instructions. Call complete.

## 2023-04-07 ASSESSMENT — ENCOUNTER SYMPTOMS
WHEEZING: 0
COUGH: 0
BACK PAIN: 1
SORE THROAT: 0
NAUSEA: 0
SHORTNESS OF BREATH: 0
ABDOMINAL PAIN: 0
CHEST TIGHTNESS: 0
VOICE CHANGE: 0
VOMITING: 0

## 2023-07-26 ENCOUNTER — OFFICE VISIT (OUTPATIENT)
Dept: CARDIOLOGY CLINIC | Age: 58
End: 2023-07-26
Payer: COMMERCIAL

## 2023-07-26 VITALS
DIASTOLIC BLOOD PRESSURE: 72 MMHG | WEIGHT: 245.8 LBS | BODY MASS INDEX: 32.58 KG/M2 | HEIGHT: 73 IN | SYSTOLIC BLOOD PRESSURE: 124 MMHG | HEART RATE: 81 BPM | OXYGEN SATURATION: 95 %

## 2023-07-26 DIAGNOSIS — I25.10 ATHEROSCLEROSIS OF NATIVE CORONARY ARTERY OF NATIVE HEART WITHOUT ANGINA PECTORIS: Primary | ICD-10-CM

## 2023-07-26 DIAGNOSIS — I10 PRIMARY HYPERTENSION: Chronic | ICD-10-CM

## 2023-07-26 DIAGNOSIS — E78.2 MIXED HYPERLIPIDEMIA: ICD-10-CM

## 2023-07-26 PROCEDURE — 3074F SYST BP LT 130 MM HG: CPT | Performed by: NURSE PRACTITIONER

## 2023-07-26 PROCEDURE — 3017F COLORECTAL CA SCREEN DOC REV: CPT | Performed by: NURSE PRACTITIONER

## 2023-07-26 PROCEDURE — G8427 DOCREV CUR MEDS BY ELIG CLIN: HCPCS | Performed by: NURSE PRACTITIONER

## 2023-07-26 PROCEDURE — 4004F PT TOBACCO SCREEN RCVD TLK: CPT | Performed by: NURSE PRACTITIONER

## 2023-07-26 PROCEDURE — 3078F DIAST BP <80 MM HG: CPT | Performed by: NURSE PRACTITIONER

## 2023-07-26 PROCEDURE — 99214 OFFICE O/P EST MOD 30 MIN: CPT | Performed by: NURSE PRACTITIONER

## 2023-07-26 PROCEDURE — G8417 CALC BMI ABV UP PARAM F/U: HCPCS | Performed by: NURSE PRACTITIONER

## 2023-07-26 RX ORDER — NITROGLYCERIN 0.4 MG/1
0.4 TABLET SUBLINGUAL EVERY 5 MIN PRN
Qty: 20 TABLET | Refills: 2 | Status: SHIPPED | OUTPATIENT
Start: 2023-07-26 | End: 2023-07-26 | Stop reason: SDUPTHER

## 2023-07-26 RX ORDER — NITROGLYCERIN 0.4 MG/1
0.4 TABLET SUBLINGUAL EVERY 5 MIN PRN
Qty: 30 TABLET | Refills: 2 | Status: SHIPPED | OUTPATIENT
Start: 2023-07-26

## 2023-07-26 NOTE — PROGRESS NOTES
[facility]     Outpatient Follow Up Note    CHIEF COMPLAINT / HPI: Hospital Follow Up secondary to [address] record has been reviewed  Hospital Course progressed as follows per discharge summary:     Manuel Wilson is a 60 y.o. male with a past medical history of hypertension, hyperlipidemia, CAD s/p PTCA rt RV branch 7/2011 who presented with chest pain x several hours. Reports he develop CP 6/11 while sitting, he developed CP with radiation to the neck and bilateral shoulder pain. Described as aching and progressive. [place] like his heart attack in the past, but less intense. Assessment and Plan:  Chest Pain               - Reports bilateral back and shoulder pain              - Trop negative              - cardiology evaluated, stress test and if normal discharge appropriate- stress negative for ischemia, card OK with discharge, follow up scheduled in 1 month with cardiology NP  CAD               - S/p balloon angioplasty 2011              - Stress echo 2017              - Continue medical therapy     Manuel Wilson is 60 y.o. male who presents today for a routine follow up after a recent hospitalization related to the above mentioned issues. He recalls having had pain in his Rt shoulder radiating to his back and Rt lateral chest.  His last shoulder surgery was about four months ago with subsequent infx and 6 weeks of ATX via PICC  ~right arthroscopic debridement, lysis of adhesions surgery on 3/3/2023  Subjective:   Since the time of discharge, the patient admits their symptoms have improved. He's felt fine but still has the pains in his Rt shoulder and arm. He denies significant chest pain. There is no SOB/FRANKLIN. The patient is not experiencing palpitations. These symptoms are improving over the last many days. With regard to medication therapy the patient has been compliant with prescribed regimen. They have tolerated therapy to date.      Past Medical History:   Diagnosis Date    CAD

## 2023-11-17 ENCOUNTER — TELEPHONE (OUTPATIENT)
Dept: CARDIOLOGY CLINIC | Age: 58
End: 2023-11-17

## 2024-11-04 NOTE — PROGRESS NOTES
ventricle is normal in size.     SPECT images demonstrate homogeneous tracer distribution throughout the myocardium. There is normal isotope uptake at stress and rest. There is no evidence of myocardial ischemia or scar. Gated spect reveals normal myocardial thickening and wall motion.     Sum stress score of 2. No visual TID. Calculated TID of 0.91.     Left ventricular ejection fraction is normal at 66%.     Sensitivity of testing is reduced on betablockers.     Stress Interpretation   No ischemic EKG changes.   Less than 0.5 mm upsloping ST deviation with exercise.     Stress Echo: 7/11/2019  Indication: Chest Pain  Summary:   Normal stress echocardiogram study.   Normal (Negative) response to exercise.     Stress Myoview: 5/9/2012  Indication: Chest Pain  Impression:   The resting electrocardiogram is normal. Good exercise   effort. No angina occurred and no significant EKG changes occurred.   Conclusion- This is a normal exercise stress test. Myoview scans to   follow and will be reported separately.     The is no evidence for reversible ischemia of the left ventricle.     [facility]: 5/15/2011  Indication: Acute Anterior MI      Holter Monitor: 12/30/2020-12/31/2020  Indication: Known Atrial Fibrillation, Palpitations  Summary:   Predominant rhythm: NSR  PAC 1%  PVC < 1%  - Sinus  - Avg 84 bpm  - Min 56 bpm  - Max 138 bpm    Assessment/Plan:    1. Atherosclerosis of native coronary artery of native heart without angina pectoris    2. Primary hypertension    3. Mixed hyperlipidemia    4. Tobacco abuse          Coronary Artery Disease  - Denies anginal symptoms  - 5/15/2011: MI - [facility] with balloon of right coronary artery branch  - 7/11/2019: Stress Echo > Normal   - 6/12/23: Stress Martin > Negative   - EKG: Today (11/13/24) - Personally interpreted > NSR,IVCD   - Aspirin 81 mg daily   - Metoprolol Tartrate 25 mg BID  - Atorvastatin 40 mg daily       Hyperlipidemia  - 6/12/23 > , , HDL 25, LDL Direct 84, ALT

## 2024-11-13 ENCOUNTER — OFFICE VISIT (OUTPATIENT)
Dept: CARDIOLOGY CLINIC | Age: 59
End: 2024-11-13
Payer: COMMERCIAL

## 2024-11-13 ENCOUNTER — TELEPHONE (OUTPATIENT)
Dept: CARDIOLOGY CLINIC | Age: 59
End: 2024-11-13

## 2024-11-13 VITALS
OXYGEN SATURATION: 97 % | HEART RATE: 80 BPM | HEIGHT: 73 IN | BODY MASS INDEX: 32.47 KG/M2 | WEIGHT: 245 LBS | DIASTOLIC BLOOD PRESSURE: 82 MMHG | SYSTOLIC BLOOD PRESSURE: 120 MMHG

## 2024-11-13 DIAGNOSIS — Z72.0 TOBACCO ABUSE: ICD-10-CM

## 2024-11-13 DIAGNOSIS — E78.2 MIXED HYPERLIPIDEMIA: ICD-10-CM

## 2024-11-13 DIAGNOSIS — I10 PRIMARY HYPERTENSION: Chronic | ICD-10-CM

## 2024-11-13 DIAGNOSIS — I25.10 ATHEROSCLEROSIS OF NATIVE CORONARY ARTERY OF NATIVE HEART WITHOUT ANGINA PECTORIS: Primary | ICD-10-CM

## 2024-11-13 PROCEDURE — 4004F PT TOBACCO SCREEN RCVD TLK: CPT | Performed by: INTERNAL MEDICINE

## 2024-11-13 PROCEDURE — 3074F SYST BP LT 130 MM HG: CPT | Performed by: INTERNAL MEDICINE

## 2024-11-13 PROCEDURE — G8484 FLU IMMUNIZE NO ADMIN: HCPCS | Performed by: INTERNAL MEDICINE

## 2024-11-13 PROCEDURE — G8427 DOCREV CUR MEDS BY ELIG CLIN: HCPCS | Performed by: INTERNAL MEDICINE

## 2024-11-13 PROCEDURE — 3079F DIAST BP 80-89 MM HG: CPT | Performed by: INTERNAL MEDICINE

## 2024-11-13 PROCEDURE — 99203 OFFICE O/P NEW LOW 30 MIN: CPT | Performed by: INTERNAL MEDICINE

## 2024-11-13 PROCEDURE — G8417 CALC BMI ABV UP PARAM F/U: HCPCS | Performed by: INTERNAL MEDICINE

## 2024-11-13 PROCEDURE — 3017F COLORECTAL CA SCREEN DOC REV: CPT | Performed by: INTERNAL MEDICINE

## 2024-11-13 PROCEDURE — 93000 ELECTROCARDIOGRAM COMPLETE: CPT | Performed by: INTERNAL MEDICINE

## 2024-11-13 NOTE — TELEPHONE ENCOUNTER
Insruance is good, Patients  is wrong with [employer]. They will be taking care of this on the patients end.     Call ref: [phone]

## 2024-11-13 NOTE — PATIENT INSTRUCTIONS
No medication changes today     Quit smoking    Continue working on weight loss and diet modifications to improve triglyceride level    Follow up with Dr. Saeed in 1 year